# Patient Record
Sex: FEMALE | Race: WHITE | Employment: UNEMPLOYED | URBAN - METROPOLITAN AREA
[De-identification: names, ages, dates, MRNs, and addresses within clinical notes are randomized per-mention and may not be internally consistent; named-entity substitution may affect disease eponyms.]

---

## 2021-08-12 ENCOUNTER — OFFICE VISIT (OUTPATIENT)
Dept: ORTHOPEDIC SURGERY | Age: 36
End: 2021-08-12
Payer: MEDICAID

## 2021-08-12 VITALS
OXYGEN SATURATION: 99 % | WEIGHT: 122 LBS | HEIGHT: 62 IN | HEART RATE: 90 BPM | TEMPERATURE: 97.7 F | BODY MASS INDEX: 22.45 KG/M2

## 2021-08-12 DIAGNOSIS — M25.552 LEFT HIP PAIN: Primary | ICD-10-CM

## 2021-08-12 DIAGNOSIS — M76.32 IT BAND SYNDROME, LEFT: ICD-10-CM

## 2021-08-12 DIAGNOSIS — M70.62 TROCHANTERIC BURSITIS OF LEFT HIP: ICD-10-CM

## 2021-08-12 PROCEDURE — 20610 DRAIN/INJ JOINT/BURSA W/O US: CPT | Performed by: PHYSICIAN ASSISTANT

## 2021-08-12 PROCEDURE — 99203 OFFICE O/P NEW LOW 30 MIN: CPT | Performed by: PHYSICIAN ASSISTANT

## 2021-08-12 PROCEDURE — 73502 X-RAY EXAM HIP UNI 2-3 VIEWS: CPT | Performed by: PHYSICIAN ASSISTANT

## 2021-08-12 RX ORDER — TRIAMCINOLONE ACETONIDE 40 MG/ML
40 INJECTION, SUSPENSION INTRA-ARTICULAR; INTRAMUSCULAR ONCE
Status: COMPLETED | OUTPATIENT
Start: 2021-08-12 | End: 2021-08-12

## 2021-08-12 RX ADMIN — TRIAMCINOLONE ACETONIDE 40 MG: 40 INJECTION, SUSPENSION INTRA-ARTICULAR; INTRAMUSCULAR at 13:04
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
## 2021-08-19 ENCOUNTER — APPOINTMENT (OUTPATIENT)
Dept: PHYSICAL THERAPY | Age: 36
End: 2021-08-19
Attending: PHYSICIAN ASSISTANT

## 2021-08-24 ENCOUNTER — HOSPITAL ENCOUNTER (OUTPATIENT)
Dept: PHYSICAL THERAPY | Age: 36
Discharge: HOME | End: 2021-08-24
Attending: PHYSICIAN ASSISTANT
Payer: MEDICAID

## 2021-08-24 PROCEDURE — 97162 PT EVAL MOD COMPLEX 30 MIN: CPT

## 2021-08-24 NOTE — PROGRESS NOTES
PT DAILY TREATMENT NOTE  10-18    Patient Name: Claire Duarte  Date:2021  : 1985  [x]  Patient  Verified  Payor: [address] / Plan: [address] [facility] / Product Type: Managed Care Medicaid /    In time: 10:18  Out time:11:01  Total Treatment Time (min): 43  Visit #: 1 of 10    Treatment Area: Left hip pain [M25.552]    SUBJECTIVE  Pain Level (0-10 scale): 4  Any medication changes, allergies to medications, adverse drug reactions, diagnosis change, or new procedure performed?: [x] No    [] Yes (see summary sheet for update)  Subjective functional status/changes:   [] No changes reported  See POC    OBJECTIVE    20 min [x]Eval                  []Re-Eval     12 min Therapeutic Exercise:  [] See flow sheet : HEP instruction and demonstration   Rationale: increase ROM and increase strength to improve the patients ability to tolerate ADLs    8 min Self Care/Home Management: []  See flow sheet : pt education regarding anatomy and physiology of the LEs/pelvis and how it relates to the pt's condition. Rationale: increase ROM, increase strength and decrease pain/symptoms  to improve the patients ability to tolerate functional tasks. 3 min Manual Therapy: in right s/l: MET to correct sacral torsion. The manual therapy interventions were performed at a separate and distinct time from the therapeutic activities interventions. Rationale: decrease pain, increase ROM, increase tissue extensibility and increase postural awareness to improve ease of mobility. With   [x] TE   [] TA   [] neuro   [x] Other: Self Care/Home management Patient Education: [x] Review HEP    [] Progressed/Changed HEP based on:   [] positioning   [] body mechanics   [] transfers   [] heat/ice application    [] other:      Other Objective/Functional Measures: See evaluation. Pain Level (0-10 scale) post treatment: 5-6 weakness feeling    ASSESSMENT/Changes in Function: Pt given HEP handout to perform.  Pt understood exercises in HEP handout. Improvement in pelvic alignment noted post manual today but did report increased weakness at the end of the evaluation. Pt demonstrated decreased AROM, decreased strength, muscle tightness, tenderness to palpation, and impaired mobility. Pt would benefit from physical therapy to improve the above impairments to help the pt return to performing ADLs and functional ctivities. Patient will continue to benefit from skilled PT services to modify and progress therapeutic interventions, address functional mobility deficits, address ROM deficits, address strength deficits, analyze and address soft tissue restrictions, analyze and cue movement patterns, analyze and modify body mechanics/ergonomics, assess and modify postural abnormalities, address imbalance/dizziness and instruct in home and community integration to attain remaining goals. [x]  See Plan of Care  []  See progress note/recertification  []  See Discharge Summary         Progress towards goals / Updated goals:  Short Term Goals: To be accomplished in 2 treatments:  1. Pt will report compliance and independence to HEP to help the pt manage their pain and symptoms. Eval: established   Long Term Goals: To be accomplished in 10 treatments:  1. Pt will report 50% improvement in pain/symptoms to improve ability to perform ADLs. Eval: 0%  2. Pt will increase MMT left hip ABD to 4+/5, left hip ER to 4/5 to improve ability to tolerate prolonged ambulation. Eval: 4-/5 for both (pain with ER)  3. Pt will increase AROM left hip ER to 30 degs to improve ability to perform  tasks. Eval: 22 degs  4. Pt will report being able to sleep on her left side with minimal to no increased pain to improve sleep quality. Eval: reports having pain with sleeping on her left side.      PLAN  [x]  Upgrade activities as tolerated     [x]  Continue plan of care  [x]  Update interventions per flow sheet       []  Discharge due to:_  [] Other:_      Nadir De La Garza, PT 8/24/2021  11:08 AM    No future appointments.
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
## 2021-08-31 ENCOUNTER — TELEPHONE (OUTPATIENT)
Dept: ORTHOPEDIC SURGERY | Age: 36
End: 2021-08-31

## 2021-08-31 NOTE — TELEPHONE ENCOUNTER
Patient was last seen on 08/12 for left hip pain. She called today stating she is in a lot of pain while attending physical therapy. She is requesting a medication for this pain. Please advise.      Patient [phone]

## 2021-08-31 NOTE — TELEPHONE ENCOUNTER
Please let patient know that we recommend over-the-counter topical anti-inflammatory/analgesic medications and/or oral anti-inflammatory/analgesic medications OTC per 's recommended dosing.

## 2021-09-01 ENCOUNTER — HOSPITAL ENCOUNTER (OUTPATIENT)
Dept: PHYSICAL THERAPY | Age: 36
Discharge: HOME | End: 2021-09-01
Attending: PHYSICIAN ASSISTANT
Payer: MEDICAID

## 2021-09-01 PROCEDURE — 97112 NEUROMUSCULAR REEDUCATION: CPT

## 2021-09-01 PROCEDURE — 97140 MANUAL THERAPY 1/> REGIONS: CPT

## 2021-09-01 PROCEDURE — 97110 THERAPEUTIC EXERCISES: CPT

## 2021-09-01 NOTE — PROGRESS NOTES
PT DAILY TREATMENT NOTE     Patient Name: Louie Mónica  Date:2021  : 1985  [x]  Patient  Verified  Payor: [address] / Plan: [address] [facility] / Product Type: Managed Care Medicaid /    In time:940  Out time:1024  Total Treatment Time (min): 44  Visit #: 2 of 10      Treatment Area: Left hip pain [M25.552]    SUBJECTIVE  Pain Level (0-10 scale): 0  Any medication changes, allergies to medications, adverse drug reactions, diagnosis change, or new procedure performed?: [x] No    [] Yes (see summary sheet for update)  Subjective functional status/changes:   [] No changes reported  Patient reported improvement in left hip pain since eval    OBJECTIVE        24 min Therapeutic Exercise:  [x] See flow sheet :   Rationale: increase ROM and increase strength to improve the patients ability to perform ADLs      10 min Neuromuscular Re-education:  [x]  See flow sheet : core stability   Rationale: increase strength and improve coordination  to improve the patients ability to perform ADLs    10 min Manual Therapy:  Stork correction, sacral mobs, long axis distraction left LE   The manual therapy interventions were performed at a separate and distinct time from the therapeutic activities interventions. Rationale: decrease pain and increase ROM to increase B innominate mobility and decrease pain with functional movement. With   [] TE   [] TA   [] neuro   [] other: Patient Education: [x] Review HEP    [] Progressed/Changed HEP based on:   [] positioning   [] body mechanics   [] transfers   [] heat/ice application    [] other:      Other Objective/Functional Measures: initiated treatment per flow sheet and POC     Pain Level (0-10 scale) post treatment: 2    ASSESSMENT/Changes in Function: initiated treatment per POC and flow sheet.   Pelvic alignment corrected prior to therex and patient was able to tolerate with minimal increase in left hip \"soreness\" post.    Patient will continue to benefit from skilled PT services to modify and progress therapeutic interventions, address functional mobility deficits, address ROM deficits, address strength deficits, analyze and address soft tissue restrictions and analyze and cue movement patterns to attain remaining goals. [x]  See Plan of Care  []  See progress note/recertification  []  See Discharge Summary         Progress towards goals / Updated goals:  Short Term Goals: To be accomplished in 2 treatments:  1. Pt will report compliance and independence to [facility] to help the pt manage their pain and symptoms. Eval: established   Long Term Goals: To be accomplished in 10 treatments:  1. Pt will report 50% improvement in pain/symptoms to improve ability to perform ADLs. Eval: 0%  2. Pt will increase MMT left hip ABD to 4+/5, left hip ER to 4/5 to improve ability to tolerate prolonged ambulation. Eval: 4-/5 for both (pain with ER)  3. Pt will increase AROM left hip ER to 30 degs to improve ability to perform  tasks. Eval: 22 degs  4. Pt will report being able to sleep on her left side with minimal to no increased pain to improve sleep quality.    Eval: reports having pain with sleeping on her left side.        PLAN  []  Upgrade activities as tolerated     [x]  Continue plan of care  []  Update interventions per flow sheet       []  Discharge due to:_  []  Other:_      Serg Storey PTA 9/1/2021  10:05 AM    Future Appointments   Date Time Provider Ghassan Schmidt   9/3/2021 10:15 AM Devora Hamman, PTA [facility]   9/8/2021 10:15 AM Devora Hamman, PTA [facility]   9/10/2021 11:45 AM Bety Martinez PT [facility]   9/15/2021 11:00 AM Devora Hamman, PTA [facility]   9/17/2021 11:00 AM Bety Martinez PT [facility]   9/22/2021 11:00 AM Devora Hamman, PTA [facility]   9/24/2021 11:00 AM Bety Martinez PT [facility]   9/29/2021 11:00 AM Devora Hamman, PTA [facility]   10/1/2021 11:00 AM [facility] Cisneros [facility]

## 2021-09-03 ENCOUNTER — APPOINTMENT (OUTPATIENT)
Dept: PHYSICAL THERAPY | Age: 36
End: 2021-09-03
Attending: PHYSICIAN ASSISTANT
Payer: MEDICAID

## 2021-09-08 ENCOUNTER — HOSPITAL ENCOUNTER (OUTPATIENT)
Dept: PHYSICAL THERAPY | Age: 36
Discharge: HOME | End: 2021-09-08
Attending: PHYSICIAN ASSISTANT
Payer: MEDICAID

## 2021-09-08 PROCEDURE — 97014 ELECTRIC STIMULATION THERAPY: CPT

## 2021-09-08 PROCEDURE — 97110 THERAPEUTIC EXERCISES: CPT

## 2021-09-08 PROCEDURE — 97140 MANUAL THERAPY 1/> REGIONS: CPT

## 2021-09-08 NOTE — PROGRESS NOTES
PT DAILY TREATMENT NOTE     Patient Name: Beatriz Abreu  Date:2021  : 1985  [x]  Patient  Verified  Payor: [address] / Plan: [address] [facility] / Product Type: Managed Care Medicaid /    In time:330  Out time:410  Total Treatment Time (min): 40  Visit #: 3 of 10    Medicare/BCBS Only   Total Timed Codes (min):  40 1:1 Treatment Time:  30       Treatment Area: Left hip pain [M25.552]    SUBJECTIVE  Pain Level (0-10 scale): 5  Any medication changes, allergies to medications, adverse drug reactions, diagnosis change, or new procedure performed?: [x] No    [] Yes (see summary sheet for update)  Subjective functional status/changes:   [] No changes reported  Patient reported increased pain starting today in left hip    OBJECTIVE    Modality rationale: decrease pain and increase tissue extensibility to improve the patients ability to perform ADLs   Min Type Additional Details   10 [x] Estim:  [x]Unatt       []IFC  []Premod                        [x]Other: HiVolt [x]w/ice   []w/heat  Position:prone  Location: left piriformis    [] Estim: []Att    []TENS instruct  []NMES                    []Other:  []w/US   []w/ice   []w/heat  Position:  Location:    []  Traction: [] Cervical       []Lumbar                       [] Prone          []Supine                       []Intermittent   []Continuous Lbs:  [] before manual  [] after manual    []  Ultrasound: []Continuous   [] Pulsed                           []1MHz   []3MHz W/cm2:  Location:    []  Iontophoresis with dexamethasone         Location: [] Take home patch   [] In clinic    []  Ice     []  heat  []  Ice massage  []  Laser   []  Anodyne Position:  Location:    []  Laser with stim  []  Other:  Position:  Location:    []  Vasopneumatic Device    []  Right     []  Left  Pre-treatment girth:  Post-treatment girth:  Measured at (location):  Pressure:       [] lo [] med [] hi   Temperature: [] lo [] med [] hi   [] Skin assessment post-treatment:  []intact []redness- no adverse reaction    []redness  adverse reaction:         20 min Therapeutic Exercise:  [x] See flow sheet :   Rationale: increase ROM, increase strength and increase proprioception to improve the patients ability to perform ADLs        10 min Manual Therapy:  TPR left piriformis, sacral mobs, left LE long axis distraction and shot gun technique   The manual therapy interventions were performed at a separate and distinct time from the therapeutic activities interventions. Rationale: decrease pain, increase ROM and decrease trigger points to perform ADLs            With   [] TE   [] TA   [] neuro   [] other: Patient Education: [x] Review HEP    [] Progressed/Changed HEP based on:   [] positioning   [] body mechanics   [] transfers   [] heat/ice application    [] other:      Other Objective/Functional Measures:   Sacral torsion and left up slip - upslip corrected with long axis distraction but limited correction of sacral torsion     trial hivolt to treat mm spasms in left piriformis and improved alignment     Pain Level (0-10 scale) post treatment: 2    ASSESSMENT/Changes in Function: limited exercises and trial HiVolt to treat increased pain and mm spasm left piriformis. Improved pain reported post treatment. Patient will continue to benefit from skilled PT services to modify and progress therapeutic interventions, address functional mobility deficits, address ROM deficits, address strength deficits, analyze and address soft tissue restrictions and analyze and cue movement patterns to attain remaining goals. [x]  See Plan of Care  []  See progress note/recertification  []  See Discharge Summary         Progress towards goals / Updated goals:  Short Term Goals: To be accomplished in 2 treatments:  1. Pt will report compliance and independence to HEP to help the pt manage their pain and symptoms.             Eval: established   Long Term Goals: To be accomplished in 10 treatments:  1. Pt will report 50% improvement in pain/symptoms to improve ability to perform ADLs. Eval: 0%  2. Pt will increase MMT left hip ABD to 4+/5, left hip ER to 4/5 to improve ability to tolerate prolonged ambulation. Eval: 4-/5 for both (pain with ER)  3. Pt will increase AROM left hip ER to 30 degs to improve ability to perform  tasks. Eval: 22 degs  4. Pt will report being able to sleep on her left side with minimal to no increased pain to improve sleep quality.    Eval: reports having pain with sleeping on her left side.        PLAN  []  Upgrade activities as tolerated     [x]  Continue plan of care  []  Update interventions per flow sheet       []  Discharge due to:_  []  Other:_      Sterling Fierro PTA 9/8/2021  3:46 PM    Future Appointments   Date Time Provider Ghassan Schmidt   9/10/2021 11:45 AM Murlene Yousuf, PT [facility]   9/15/2021 11:00 AM Gisele [place], PTA [facility]   9/17/2021 11:00 AM Murlene Yousuf, PT [facility]   9/22/2021 11:00 AM Gisele [place], PTA [facility]   9/24/2021 11:00 AM Murlene Yousuf, PT [facility]   9/29/2021 11:00 AM Gisele [place], PTA [facility]   10/1/2021 11:00 AM Murlene Yousuf, PT [facility]   10/6/2021  9:30 AM Murlene Yousuf, PT [facility]

## 2021-09-10 ENCOUNTER — HOSPITAL ENCOUNTER (OUTPATIENT)
Dept: PHYSICAL THERAPY | Age: 36
Discharge: HOME | End: 2021-09-10
Attending: PHYSICIAN ASSISTANT
Payer: MEDICAID

## 2021-09-10 PROCEDURE — 97140 MANUAL THERAPY 1/> REGIONS: CPT

## 2021-09-10 PROCEDURE — 97032 APPL MODALITY 1+ESTIM EA 15: CPT

## 2021-09-10 PROCEDURE — 97110 THERAPEUTIC EXERCISES: CPT

## 2021-09-10 NOTE — PROGRESS NOTES
PT DAILY TREATMENT NOTE     Patient Name: Duanne Felty  Date:9/10/2021  : 1985  [x]  Patient  Verified  Payor: Harvinder Seo / Plan: [address] [place] [address] / Product Type: Managed Care Medicaid /    In time:1145  Out time:1230  Total Treatment Time (min): 45  Visit #: 4 of 10    Medicare/BCBS Only   Total Timed Codes (min):   1:1 Treatment Time:         Treatment Area: Left hip pain [M25.552]    SUBJECTIVE  Pain Level (0-10 scale): 4-5  Any medication changes, allergies to medications, adverse drug reactions, diagnosis change, or new procedure performed?: [x] No    [] Yes (see summary sheet for update)  Subjective functional status/changes:   [] No changes reported  Patient reported two days of pain relief after last visit but return of left sided back and buttock pain today    OBJECTIVE    Modality rationale: decrease pain and increase tissue extensibility to improve the patients ability to perform ADLs   Min Type Additional Details    [] Estim:  []Unatt       []IFC  []Premod                        []Other:  []w/ice   []w/heat  Position:  Location:   8 [x] Estim: [x]Att    []TENS instruct  []NMES                    [x]Other: combo [x]w/US   []w/ice   []w/heat  Position:prone  Location: left piriformis    []  Traction: [] Cervical       []Lumbar                       [] Prone          []Supine                       []Intermittent   []Continuous Lbs:  [] before manual  [] after manual    []  Ultrasound: []Continuous   [] Pulsed                           []1MHz   []3MHz W/cm2:  Location:    []  Iontophoresis with dexamethasone         Location: [] Take home patch   [] In clinic    []  Ice     []  heat  []  Ice massage  []  Laser   []  Anodyne Position:  Location:    []  Laser with stim  []  Other:  Position:  Location:    []  Vasopneumatic Device    []  Right     []  Left  Pre-treatment girth:  Post-treatment girth:  Measured at (location):  Pressure:       [] lo [] med [] hi   Temperature: [] lo [] med [] hi   [] Skin assessment post-treatment:  []intact []redness- no adverse reaction    []redness  adverse reaction:         27 min Therapeutic Exercise:  [x] See flow sheet :   Rationale: increase ROM and increase strength to improve the patients ability to perform ADLs      10 min Manual Therapy:  MET for anterior rotation right innominate, long axis distraction Left LE, sacral mobs for sacral torsion correction, TPR left lumbar paraspinals   The manual therapy interventions were performed at a separate and distinct time from the therapeutic activities interventions. Rationale: decrease pain, increase ROM, increase tissue extensibility and decrease trigger points to perform ADLs  Rationale: With   [] TE   [] TA   [] neuro   [] other: Patient Education: [x] Review HEP    [] Progressed/Changed HEP based on:   [] positioning   [] body mechanics   [] transfers   [] heat/ice application    [] other:      Other Objective/Functional Measures: sacral torsion, right anterior innominate and left upslip corrections utilized during treatment to correct alignment througout treatment     Frequent VCs and tactile cuings for proper TA set required with therex and unable to maintain in prone and substituted lumbar paraspinals to perform hip ext     Pain Level (0-10 scale) post treatment: 1    ASSESSMENT/Changes in Function: had to correct pelvic alignment 3 times today as patient struggled with maintaining core stability and substituted with lumbar paraspinals and hip flexors initially with limited ability to correct technique with cuing today. Patient will continue to benefit from skilled PT services to modify and progress therapeutic interventions, address functional mobility deficits, address ROM deficits, address strength deficits, analyze and address soft tissue restrictions and analyze and cue movement patterns to attain remaining goals.      [x]  See Plan of Care  []  See progress note/recertification  []  See Discharge Summary         Progress towards goals / Updated goals:  Short Term Goals: To be accomplished in 2 treatments:  1. Pt will report compliance and independence to HEP to help the pt manage their pain and symptoms.             Eval: established   Current: progressing, not independent maintaining TA set with HEP  Long Term Goals: To be accomplished in 10 treatments:  1. Pt will report 50% improvement in pain/symptoms to improve ability to perform ADLs. Eval: 0%  2. Pt will increase MMT left hip ABD to 4+/5, left hip ER to 4/5 to improve ability to tolerate prolonged ambulation. Eval: 4-/5 for both (pain with ER)  3. Pt will increase AROM left hip ER to 30 degs to improve ability to perform  tasks. Eval: 22 degs  4. Pt will report being able to sleep on her left side with minimal to no increased pain to improve sleep quality.    Eval: reports having pain with sleeping on her left side.        PLAN  []  Upgrade activities as tolerated     [x]  Continue plan of care  []  Update interventions per flow sheet       []  Discharge due to:_  []  Other:_      Chris Stinson PTA 9/10/2021  12:36 PM    Future Appointments   Date Time Provider Ghassan Schmidt   9/15/2021 11:00 AM Aracely Hudson PTA [facility]   9/17/2021 11:00 AM Fior Ku, PT [facility]   9/22/2021 11:00 AM Aracely Hudson PTA [facility]   9/24/2021 11:00 AM Fior Ku PT [facility]   9/29/2021 11:00 AM Aracely Hudson PTA [facility]   10/1/2021 11:00 AM Fior Ku PT [facility]   10/6/2021  9:30 AM Fior Ku, PT [facility]

## 2021-09-15 ENCOUNTER — HOSPITAL ENCOUNTER (OUTPATIENT)
Dept: PHYSICAL THERAPY | Age: 36
Discharge: HOME | End: 2021-09-15
Attending: PHYSICIAN ASSISTANT
Payer: MEDICAID

## 2021-09-15 PROCEDURE — 97140 MANUAL THERAPY 1/> REGIONS: CPT

## 2021-09-15 PROCEDURE — 97032 APPL MODALITY 1+ESTIM EA 15: CPT

## 2021-09-15 PROCEDURE — 97110 THERAPEUTIC EXERCISES: CPT

## 2021-09-15 NOTE — PROGRESS NOTES
PT DAILY TREATMENT NOTE     Patient Name: Davida Ortiz  Date:9/15/2021  : 1985  [x]  Patient  Verified  Payor: [address] / Plan: [address] [facility] / Product Type: Managed Care Medicaid /    In time:1100  Out time:1150  Total Treatment Time (min): 50  Visit #: 5 of 10    Medicare/BCBS Only   Total Timed Codes (min):   1:1 Treatment Time:         Treatment Area: Left hip pain [M25.552]    SUBJECTIVE  Pain Level (0-10 scale): 4  Any medication changes, allergies to medications, adverse drug reactions, diagnosis change, or new procedure performed?: [x] No    [] Yes (see summary sheet for update)  Subjective functional status/changes:   [] No changes reported  Patient reported some weakness and sharp pain periodically when she goes to stand up in left LE    OBJECTIVE    Modality rationale: decrease pain and increase tissue extensibility to improve the patients ability to perform ADLs   Min Type Additional Details    [] Estim:  []Unatt       []IFC  []Premod                        []Other:  []w/ice   []w/heat  Position:  Location:   10 (with set up) [x] Estim: [x]Att    []TENS instruct  []NMES                    []Other:  [x]w/US   []w/ice   []w/heat  Position:prone  Location:left glutes and lumbar paraspinals    []  Traction: [] Cervical       []Lumbar                       [] Prone          []Supine                       []Intermittent   []Continuous Lbs:  [] before manual  [] after manual    []  Ultrasound: []Continuous   [] Pulsed                           []1MHz   []3MHz W/cm2:  Location:    []  Iontophoresis with dexamethasone         Location: [] Take home patch   [] In clinic    []  Ice     []  heat  []  Ice massage  []  Laser   []  Anodyne Position:  Location:    []  Laser with stim  []  Other:  Position:  Location:    []  Vasopneumatic Device    []  Right     []  Left  Pre-treatment girth:  Post-treatment girth:  Measured at (location):  Pressure:       [] lo [] med [] hi Temperature: [] lo [] med [] hi   [] Skin assessment post-treatment:  []intact []redness- no adverse reaction    []redness  adverse reaction:         20 min Therapeutic Exercises:  [x]  See flow sheet :   Rationale: increase ROM and increase strength  to improve the patients ability to perform ADLs    20 min Manual Therapy:  T/s segmental mobs T7-8, sacral mobs and innominate mobs on left to correct posterior alignment, TPR left paraspinals and glutes   The manual therapy interventions were performed at a separate and distinct time from the therapeutic activities interventions. Rationale: decrease pain, increase ROM and increase tissue extensibility to perform ADLs              With   [] TE   [] TA   [] neuro   [] other: Patient Education: [x] Review HEP    [] Progressed/Changed HEP based on:   [] positioning   [] body mechanics   [] transfers   [] heat/ice application    [] other:      Other Objective/Functional Measures:   -posterior left innominate with sacral torsion and T/S rotation at T7-8 : corrected with MET and mobilization  -added high knees and toes touches, l/s stabs in prone, B HS stretching and dyn HS on left     Pain Level (0-10 scale) post treatment: 0    ASSESSMENT/Changes in Function: patient progressing well as able to fully correct pelvic alignment and maintain correction after performing therex today. Patient will continue to benefit from skilled PT services to modify and progress therapeutic interventions, address functional mobility deficits, address ROM deficits, address strength deficits and analyze and address soft tissue restrictions to attain remaining goals. [x]  See Plan of Care  []  See progress note/recertification  []  See Discharge Summary         Progress towards goals / Updated goals:  Short Term Goals: To be accomplished in 2 treatments:  1. Pt will report compliance and independence to HEP to help the pt manage their pain and symptoms.             Eval: established Current: progressing, not independent maintaining TA set with HEP  Long Term Goals: To be accomplished in 10 treatments:  1. Pt will report 50% improvement in pain/symptoms to improve ability to perform ADLs. Eval: 0%  Current: patient reported 0/10 pain posted pelvic alignment correction (9/15/21)  2. Pt will increase MMT left hip ABD to 4+/5, left hip ER to 4/5 to improve ability to tolerate prolonged ambulation. Eval: 4-/5 for both (pain with ER)  3. Pt will increase AROM left hip ER to 30 degs to improve ability to perform  tasks. Eval: 22 degs  4. Pt will report being able to sleep on her left side with minimal to no increased pain to improve sleep quality.    Eval: reports having pain with sleeping on her left side.        PLAN  []  Upgrade activities as tolerated     [x]  Continue plan of care  []  Update interventions per flow sheet       []  Discharge due to:_  []  Other:_      Peace Zhao PTA 9/15/2021  12:01 PM    Future Appointments   Date Time Provider Ghassan Schmidt   9/17/2021 11:00 AM Polo Del Castillo PT [facility]   9/22/2021 11:00 AM Carleen Quinteros PTA [facility]   9/24/2021 11:00 AM Polo Del Castillo PT [facility]   9/29/2021 11:00 AM Carleen Quinteros PTA [facility]   10/1/2021 11:00 AM Polo Del Castillo PT [facility]   10/6/2021  9:30 AM Polo Del Castillo PT [facility]

## 2021-09-17 ENCOUNTER — HOSPITAL ENCOUNTER (OUTPATIENT)
Dept: PHYSICAL THERAPY | Age: 36
Discharge: HOME | End: 2021-09-17
Attending: PHYSICIAN ASSISTANT
Payer: MEDICAID

## 2021-09-17 PROCEDURE — 97112 NEUROMUSCULAR REEDUCATION: CPT

## 2021-09-17 PROCEDURE — 97110 THERAPEUTIC EXERCISES: CPT

## 2021-09-17 PROCEDURE — 97035 APP MDLTY 1+ULTRASOUND EA 15: CPT

## 2021-09-17 NOTE — PROGRESS NOTES
PT DAILY TREATMENT NOTE     Patient Name: Dre Tubbs  Date:2021  : 1985  [x]  Patient  Verified  Payor: [address] [place] [address] Ave / Plan: [address] [facility] / Product Type: Managed Care Medicaid /    In time:1111  Out time:1148  Total Treatment Time (min): 37  Visit #: 6 of 10    Medicare/BCBS Only   Total Timed Codes (min):  37 1:1 Treatment Time:  37       Treatment Area: Left hip pain [M25.552]    SUBJECTIVE  Pain Level (0-10 scale): 2  Any medication changes, allergies to medications, adverse drug reactions, diagnosis change, or new procedure performed?: [x] No    [] Yes (see summary sheet for update)  Subjective functional status/changes:   [] No changes reported  Pain stayed away until this morning.     OBJECTIVE    Modality rationale: decrease inflammation, decrease pain and increase tissue extensibility to improve the patients ability to tolerate increased activity   Min Type Additional Details    [] Estim:  []Unatt       []IFC  []Premod                        []Other:  []w/ice   []w/heat  Position:  Location:    [] Estim: []Att    []TENS instruct  []NMES                    []Other:  []w/US   []w/ice   []w/heat  Position:  Location:    []  Traction: [] Cervical       []Lumbar                       [] Prone          []Supine                       []Intermittent   []Continuous Lbs:  [] before manual  [] after manual   8 [x]  Ultrasound: [x]Continuous   [] Pulsed                           [x]1MHz   []3MHz W/cm2: 1.2  Location:Left Superior glute/glute medius    []  Iontophoresis with dexamethasone         Location: [] Take home patch   [] In clinic    []  Ice     []  heat  []  Ice massage  []  Laser   []  Anodyne Position:  Location:    []  Laser with stim  []  Other:  Position:  Location:    []  Vasopneumatic Device    []  Right     []  Left  Pre-treatment girth:  Post-treatment girth:  Measured at (location):  Pressure:       [] lo [] med [] hi   Temperature: [] lo [] med [] hi   [] Skin assessment post-treatment:  []intact []redness- no adverse reaction    []redness  adverse reaction:     12 min Therapeutic Exercise:  [x] See flow sheet :   Rationale: increase ROM, increase strength and improve coordination to improve the patients ability to tolerate increased activity    9 min Neuromuscular Re-education:  []  See flow sheet :   Rationale: increase strength and improve coordination  to improve the patients ability to improve core stability    8 min Manual Therapy:  (B) Innominate p/a mobs, (B) LE LAD, (B) L/S and T/S Rot mobs, STM/DTM left Superior glute/Glute Medius     The manual therapy interventions were performed at a separate and distinct time from the therapeutic activities interventions. Rationale: decrease pain, increase ROM, increase tissue extensibility and decrease trigger points to improve daily activity          With   [x] TE   [] TA   [x] neuro   [] other: Patient Education: [x] Review HEP    [] Progressed/Changed HEP based on:   [] positioning   [] body mechanics   [] transfers   [] heat/ice application    [] other:      Other Objective/Functional Measures:   - Improved pelvic alignment and mobility today  - Post Rot left innominate  - Elev right sacral base     Pain Level (0-10 scale) post treatment: 1    ASSESSMENT/Changes in Function:   - Good response to treatment with improved pelvic alignment and mobility upon arrival today  - Good response to MT and use of US with decreased muscle tightness and decreased pain after treatment    Patient will continue to benefit from skilled PT services to modify and progress therapeutic interventions, address functional mobility deficits, address ROM deficits, address strength deficits, analyze and address soft tissue restrictions and analyze and cue movement patterns to attain remaining goals.      []  See Plan of Care  []  See progress note/recertification  []  See Discharge Summary         Progress towards goals / Updated goals:  Short Term Goals: To be accomplished in 2 treatments:  1. Pt will report compliance and independence to HEP to help the pt manage their pain and symptoms.             Eval: established   Current: progressing, not independent maintaining TA set with HEP  Long Term Goals: To be accomplished in 10 treatments:  1. Pt will report 50% improvement in pain/symptoms to improve ability to perform ADLs. Eval: 0%  Current: patient reported 0/10 pain posted pelvic alignment correction (9/15/21)  2. Pt will increase MMT left hip ABD to 4+/5, left hip ER to 4/5 to improve ability to tolerate prolonged ambulation. Eval: 4-/5 for both (pain with ER)  3. Pt will increase AROM left hip ER to 30 degs to improve ability to perform  tasks. Eval: 22 degs  4. Pt will report being able to sleep on her left side with minimal to no increased pain to improve sleep quality. Eval: reports having pain with sleeping on her left side.   Current Status:  Pt reports improved tolerance with less discomfort 9/17/21     PLAN  [x]  Upgrade activities as tolerated     [x]  Continue plan of care  []  Update interventions per flow sheet       []  Discharge due to:_  []  Other:_      Donavon Correa, PT 9/17/2021  11:13 AM    Future Appointments   Date Time Provider Ghassan Schmidt   9/22/2021 11:00 AM Shasta Sor [facility]   9/24/2021 11:00 AM Jonathan Bahena PT [facility]   9/29/2021 11:00 AM Shasta Sor [facility]   10/1/2021 11:00 AM Jonathan Bahena PT [facility]   10/6/2021  9:30 AM Jonathan Bahena PT [facility]

## 2021-09-22 ENCOUNTER — HOSPITAL ENCOUNTER (OUTPATIENT)
Dept: PHYSICAL THERAPY | Age: 36
Discharge: HOME | End: 2021-09-22
Attending: PHYSICIAN ASSISTANT
Payer: MEDICAID

## 2021-09-22 PROCEDURE — 97112 NEUROMUSCULAR REEDUCATION: CPT

## 2021-09-22 PROCEDURE — 97110 THERAPEUTIC EXERCISES: CPT

## 2021-09-22 PROCEDURE — 97530 THERAPEUTIC ACTIVITIES: CPT

## 2021-09-22 NOTE — PROGRESS NOTES
PT DAILY TREATMENT NOTE     Patient Name: Ariel Rhoades  Date:2021  : 1985  [x]  Patient  Verified  Payor: [address] / Plan: [facility] / Product Type: Managed Care Medicaid /    In time:11:00  Out time:11:46  Total Treatment Time (min): 46  Visit #: 7 of 10    Treatment Area: Left hip pain [M25.552]    SUBJECTIVE  Pain Level (0-10 scale): 0  Any medication changes, allergies to medications, adverse drug reactions, diagnosis change, or new procedure performed?: [x] No    [] Yes (see summary sheet for update)  Subjective functional status/changes:   [] No changes reported  Pt states she has been feeling better after PT appts and the pain doesn't return as quickly    OBJECTIVE    22 min Therapeutic Exercise:  [x] See flow sheet :   Rationale: increase ROM and increase strength to improve the patients ability to perform ADLs    8 min Therapeutic Activities:  [x] See flow sheet :   Rationale: increase ROM and increase strength to improve the patients ability to perform daily tasks      8 min Neuromuscular Re-education:  [x]  See flow sheet :   Rationale: increase strength, improve coordination and increase proprioception  to improve the patients ability to perform functional tasks    8 min Manual Therapy:  MET to correct right AI followed by shotgun technique, left leg pull to correct left upslip, left PA SIJ mobs, DTM/TPR to left paraspinals, piri, and upper glutes, left hip flexor release   The manual therapy interventions were performed at a separate and distinct time from the therapeutic activities interventions.   Rationale: decrease pain, increase ROM and increase tissue extensibility to improve functional mobility            With   [] TE   [] TA   [] neuro   [] other: Patient Education: [x] Review HEP    [] Progressed/Changed HEP based on:   [] positioning   [] body mechanics   [] transfers   [] heat/ice application    [] other:      Other Objective/Functional Measures:      Pain Level (0-10 scale) post treatment: 1-2    ASSESSMENT/Changes in Function: Pt reports cont'd frequent left leg \"giving out\", huseyin with stairs. Reports decr'd frequency and intensity of pain since [facility]. TTP @ right hip flexors today. cont'd ms tension @ left piri and L/s paraspinals. Held US today as pt states little to no relief recently and would like to focus on strengthening. Patient will continue to benefit from skilled PT services to modify and progress therapeutic interventions, address functional mobility deficits, address ROM deficits, address strength deficits, analyze and address soft tissue restrictions, analyze and cue movement patterns, analyze and modify body mechanics/ergonomics and assess and modify postural abnormalities to attain remaining goals. []  See Plan of Care  []  See progress note/recertification  []  See Discharge Summary         Progress towards goals / Updated goals:  Short Term Goals: To be accomplished in 2 treatments:  1. Pt will report compliance and independence to HEP to help the pt manage their pain and symptoms.             Eval: established   Current: progressing, not independent maintaining TA set with HEP  Long Term Goals: To be accomplished in 10 treatments:  1. Pt will report 50% improvement in pain/symptoms to improve ability to perform ADLs. Eval: 0%  Current: patient reported 0/10 pain posted pelvic alignment correction (9/15/21)  2. Pt will increase MMT left hip ABD to 4+/5, left hip ER to 4/5 to improve ability to tolerate prolonged ambulation. Eval: 4-/5 for both (pain with ER)  3. Pt will increase AROM left hip ER to 30 degs to improve ability to perform  tasks. Eval: 22 degs  4. Pt will report being able to sleep on her left side with minimal to no increased pain to improve sleep quality. Eval: reports having pain with sleeping on her left side.   Current Status:  Pt reports improved tolerance with less discomfort 9/17/21     PLAN  []  Upgrade activities as tolerated     [x]  Continue plan of care  []  Update interventions per flow sheet       []  Discharge due to:_  []  Other:_      Amber Pinzon PTA 9/22/2021  10:41 AM    Future Appointments   Date Time Provider Ghassan Schmidt   9/22/2021 11:00 AM Ann Elizabeth [facility]   9/24/2021 11:00 AM Heladio Kenyon, PT [facility]   9/29/2021 11:00 AM Marta Elizabeth [facility]   10/1/2021 11:00 AM Heladio Kenyon, PT [facility]   10/6/2021  9:30 AM Heladio Kenyon, PT [facility]

## 2021-09-24 ENCOUNTER — HOSPITAL ENCOUNTER (OUTPATIENT)
Dept: PHYSICAL THERAPY | Age: 36
Discharge: HOME | End: 2021-09-24
Attending: PHYSICIAN ASSISTANT
Payer: MEDICAID

## 2021-09-24 PROCEDURE — 97110 THERAPEUTIC EXERCISES: CPT

## 2021-09-24 PROCEDURE — 97116 GAIT TRAINING THERAPY: CPT

## 2021-09-24 PROCEDURE — 97112 NEUROMUSCULAR REEDUCATION: CPT

## 2021-09-24 NOTE — PROGRESS NOTES
PT DAILY TREATMENT NOTE     Patient Name: Christa Telles  Date:2021  : 1985  [x]  Patient  Verified  [address] / Plan: [address] [facility] / Product Type: Managed Care Medicaid /    In time:1100  Out time: 1145  Total Treatment Time (min): 45  Visit #: 8 of 10    Medicare/BCBS Only   Total Timed Codes (min):  45 1:1 Treatment Time:  45       Treatment Area: Left hip pain [M25.552]    SUBJECTIVE  Pain Level (0-10 scale): 0  Any medication changes, allergies to medications, adverse drug reactions, diagnosis change, or new procedure performed?: [x] No    [] Yes (see summary sheet for update)  Subjective functional status/changes:   [] No changes reported  Not having as many problems. Just feel the left knee when stepping with the left keg. No pain this week. OBJECTIVE      25 min Therapeutic Exercise:  [x] See flow sheet :   Rationale: increase ROM, increase strength and improve coordination to improve the patients ability to tolerate increased activity    10 min Neuromuscular Re-education:  []  See flow sheet :   Rationale: increase strength and improve coordination  to improve the patients ability to improve function    10 min Gait Trainin' VCs and TCs for arm swing/weight shift/trunk rot   Rationale:           With   [x] TE   [] TA   [x] neuro   [] other: Patient Education: [x] Review HEP    [] Progressed/Changed HEP based on:   [] positioning   [] body mechanics   [] transfers   [] heat/ice application    [] other:      Other Objective/Functional Measures:   - Difficulty with performing mini squats  - Gait training with guided arm swing and sequencing  - MMT left Hip ABD 5- ER 4-  - Dec pelvic obliquity  - decreased weight left shift with ambulation     Pain Level (0-10 scale) post treatment: 0    ASSESSMENT/Changes in Function:   - Verbal and tactile cues for mini squats  - Improved gait pattern with improved pelvic sway, trunk rotation and arm swing    Patient will continue to benefit from skilled PT services to modify and progress therapeutic interventions, address functional mobility deficits, address ROM deficits, address strength deficits, analyze and address soft tissue restrictions and analyze and cue movement patterns to attain remaining goals. []  See Plan of Care  []  See progress note/recertification  []  See Discharge Summary         Progress towards goals / Updated goals:  Short Term Goals: To be accomplished in 2 treatments:  1. Pt will report compliance and independence to HEP to help the pt manage their pain and symptoms.             Eval: established   Current Status: progressing, not independent maintaining TA set with HEP  Long Term Goals: To be accomplished in 10 treatments:  1. Pt will report 50% improvement in pain/symptoms to improve ability to perform ADLs. Eval: 0%  Current Status: Patient reports 40-50% improvement since start of treatment 9/24/21  2. Pt will increase MMT left hip ABD to 4+/5, left hip ER to 4/5 to improve ability to tolerate prolonged ambulation. Eval: 4-/5 for both (pain with ER)  Current Status: Progressing with MMT Left Hip ABD 5- ER 4-  3. Pt will increase AROM left hip ER to 30 degs to improve ability to perform  tasks. Eval: 22 degs  4. Pt will report being able to sleep on her left side with minimal to no increased pain to improve sleep quality. Eval: reports having pain with sleeping on her left side.   Current Status:  Pt reports improved tolerance with less discomfort 9/17/21        PLAN  [x]  Upgrade activities as tolerated     [x]  Continue plan of care  []  Update interventions per flow sheet       []  Discharge due to:_  []  Other:_      Consuelo Lr, PT 9/24/2021  11:26 AM    Future Appointments   Date Time Provider Ghassan Schmidt   9/29/2021 11:00 AM Rosy Jain   10/1/2021 11:00 AM Otilio Yang, PT [place] [facility]   10/6/2021  9:30 AM Otilio Yang, PT [facility]

## 2021-09-29 ENCOUNTER — HOSPITAL ENCOUNTER (OUTPATIENT)
Dept: PHYSICAL THERAPY | Age: 36
Discharge: HOME | End: 2021-09-29
Attending: PHYSICIAN ASSISTANT
Payer: MEDICAID

## 2021-09-29 PROCEDURE — 97530 THERAPEUTIC ACTIVITIES: CPT

## 2021-09-29 PROCEDURE — 97110 THERAPEUTIC EXERCISES: CPT

## 2021-09-29 PROCEDURE — 97112 NEUROMUSCULAR REEDUCATION: CPT

## 2021-09-29 NOTE — PROGRESS NOTES
PT DAILY TREATMENT NOTE     Patient Name: Danielle Wheeler  Date:2021  : 1985  [x]  Patient  Verified  Payor: Indira Novak / Plan: [facility] / Product Type: Managed Care Medicaid /    In time:10:56  Out time:11:43  Total Treatment Time (min): 46  Visit #: 9 of 10    Treatment Area: Left hip pain [M25.552]    SUBJECTIVE  Pain Level (0-10 scale): 2  Any medication changes, allergies to medications, adverse drug reactions, diagnosis change, or new procedure performed?: [x] No    [] Yes (see summary sheet for update)  Subjective functional status/changes:   [] No changes reported  Pt reports feeling pretty good today    OBJECTIVE    23 min Therapeutic Exercise:  [x] See flow sheet :   Rationale: increase ROM and increase strength to improve the patients ability to perform ADLs    8 min Therapeutic Activity:  -  See flow sheet :   Rationale: increase strength, improve coordination, improve balance and increase proprioception  to improve the patients ability to increase ease with daily tasks     10 min Neuromuscular Re-education:  [x]  See flow sheet :   Rationale: increase strength, improve coordination and increase proprioception  to improve the patients ability to perform functional tsaks    5 min Manual Therapy:  MET to correct right AI followed by shot gun   The manual therapy interventions were performed at a separate and distinct time from the therapeutic activities interventions.   Rationale: decrease pain, increase ROM and increase tissue extensibility to improve functional mobility             With   [] TE   [] TA   [] neuro   [] other: Patient Education: [x] Review HEP    [] Progressed/Changed HEP based on:   [] positioning   [] body mechanics   [] transfers   [] heat/ice application    [] other:      Other Objective/Functional Measures:      Pain Level (0-10 scale) post treatment: 1    ASSESSMENT/Changes in Function: Challenged with prone ext d/t limited available ROM and min c/o soreness. Challenged with SL stability, observed hip drop with right SLS. Demo's glute fatigue with clams. No pain following treatment, just some ms soreness. Patient will continue to benefit from skilled PT services to modify and progress therapeutic interventions, address functional mobility deficits, address ROM deficits, address strength deficits, analyze and address soft tissue restrictions, analyze and cue movement patterns, analyze and modify body mechanics/ergonomics and assess and modify postural abnormalities to attain remaining goals. []  See Plan of Care  []  See progress note/recertification  []  See Discharge Summary         Progress towards goals / Updated goals:  Short Term Goals: To be accomplished in 2 treatments:  1. Pt will report compliance and independence to HEP to help the pt manage their pain and symptoms.             Eval: established   Current Status: progressing, not independent maintaining TA set with HEP  Long Term Goals: To be accomplished in 10 treatments:  1. Pt will report 50% improvement in pain/symptoms to improve ability to perform ADLs. Eval: 0%  Current Status: Patient reports 40-50% improvement since start of treatment 9/24/21  2. Pt will increase MMT left hip ABD to 4+/5, left hip ER to 4/5 to improve ability to tolerate prolonged ambulation. Eval: 4-/5 for both (pain with ER)  Current Status: Progressing with MMT Left Hip ABD 5- ER 4-  3. Pt will increase AROM left hip ER to 30 degs to improve ability to perform  tasks. Eval: 22 degs  4. Pt will report being able to sleep on her left side with minimal to no increased pain to improve sleep quality. Eval: reports having pain with sleeping on her left side.   Current Status:  Met, able to sleep on left side 9/29/2021    PLAN  []  Upgrade activities as tolerated     [x]  Continue plan of care  []  Update interventions per flow sheet       []  Discharge due to:_  []  Other:_      Latasha Galvan PTA 9/29/2021  10:45 AM    Future Appointments   Date Time Provider Ghassan Schmidt   9/29/2021 11:00 AM Petrona Silver [facility]   10/1/2021 11:00 AM Maria Victoria Bundy [facility]   10/6/2021  9:30 AM Karen Boykin PT [facility]

## 2021-10-01 ENCOUNTER — APPOINTMENT (OUTPATIENT)
Dept: PHYSICAL THERAPY | Age: 36
End: 2021-10-01
Attending: PHYSICIAN ASSISTANT
Payer: MEDICAID

## 2021-10-06 ENCOUNTER — APPOINTMENT (OUTPATIENT)
Dept: PHYSICAL THERAPY | Age: 36
End: 2021-10-06
Attending: PHYSICIAN ASSISTANT
Payer: MEDICAID

## 2021-10-08 ENCOUNTER — HOSPITAL ENCOUNTER (OUTPATIENT)
Dept: PHYSICAL THERAPY | Age: 36
Discharge: HOME | End: 2021-10-08
Attending: PHYSICIAN ASSISTANT
Payer: MEDICAID

## 2021-10-08 PROCEDURE — 97110 THERAPEUTIC EXERCISES: CPT

## 2021-10-08 PROCEDURE — 97530 THERAPEUTIC ACTIVITIES: CPT

## 2021-10-08 PROCEDURE — 97112 NEUROMUSCULAR REEDUCATION: CPT
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
## 2021-10-08 NOTE — PROGRESS NOTES
PT DAILY TREATMENT NOTE     Patient Name: Jeovany Pena  Date:10/8/2021  : 1985  [x]  Patient  Verified  Payor: Felicitas Young / Plan: [address] [place] [address] / Product Type: Managed Care Medicaid /    In time:1:15  Out time:1:50  Total Treatment Time (min): 35  Visit #: 10 of 10    Treatment Area: Left hip pain [M25.552]    SUBJECTIVE  Pain Level (0-10 scale): 0  Any medication changes, allergies to medications, adverse drug reactions, diagnosis change, or new procedure performed?: [x] No    [] Yes (see summary sheet for update)  Subjective functional status/changes:   [] No changes reported  Pt reports walking around [place] scream and only have about a 1/10 pain    OBJECTIVE    10 min Therapeutic Exercise:  [x] See flow sheet :   Rationale: increase ROM and increase strength to improve the patients ability to perform ADLs    10 min Therapeutic Activity:  [x]  See flow sheet :   Rationale: increase strength and improve coordination  to improve the patients ability to increase ease with daily tasks     10 min Neuromuscular Re-education:  [x]  See flow sheet :   Rationale: increase strength, improve coordination and increase proprioception  to improve the patients ability to perform functional tasks    5 min Manual Therapy:  MET to correct right AI followed by shotgun   The manual therapy interventions were performed at a separate and distinct time from the therapeutic activities interventions.   Rationale: decrease pain, increase ROM and increase tissue extensibility to improve functional mobility             With   [] TE   [] TA   [] neuro   [] other: Patient Education: [x] Review HEP    [] Progressed/Changed HEP based on:   [] positioning   [] body mechanics   [] transfers   [] heat/ice application    [] other:      Other Objective/Functional Measures:   AROM Left hip ER 33*  MMT left hip abd 4+ ER 4+   FOTO 72    Pain Level (0-10 scale) post treatment: 0    ASSESSMENT/Changes in Function: Patient has made great progress in Physical Therapy towards improved sleep tolerance, activity tolerance, improved strength and ROM. Patient is now able to walk prolonged distances with only minimal discomfort. Patient states she has not felt this good in years. Patient is compliant and Independent with HEP and prepared for D/C from PT at this time. Patient will continue to benefit from skilled PT services to modify and progress therapeutic interventions, address functional mobility deficits, address ROM deficits, address strength deficits, analyze and address soft tissue restrictions, analyze and cue movement patterns, analyze and modify body mechanics/ergonomics and assess and modify postural abnormalities to attain remaining goals. []  See Plan of Care  []  See progress note/recertification  []  See Discharge Summary         Progress towards goals / Updated goals:  Short Term Goals: To be accomplished in 2 treatments:  1. Pt will report compliance and independence to HEP to help the pt manage their pain and symptoms.             Eval: established   Current Status: Met, pt reports compliant 10/8/2021  Long Term Goals: To be accomplished in 10 treatments:  1. Pt will report 50% improvement in pain/symptoms to improve ability to perform ADLs. Eval: 0%  Current Status: 75% improvement since [facility] 10/8/2021  2. Pt will increase MMT left hip ABD to 4+/5, left hip ER to 4/5 to improve ability to tolerate prolonged ambulation. Eval: 4-/5 for both (pain with ER)  Current Status: Progressing with MMT Left Hip ABD 5- ER 4- 10/8/2021  3. Pt will increase AROM left hip ER to 30 degs to improve ability to perform  tasks. Eval: 22 degs  Current:Met, left hip ER 33* 10/8/2021  4. Pt will report being able to sleep on her left side with minimal to no increased pain to improve sleep quality. Eval: reports having pain with sleeping on her left side.   Current Status:  Met, able to sleep on left side 9/29/2021    PLAN  []  Upgrade activities as tolerated     []  Continue plan of care  []  Update interventions per flow sheet       [x]  Discharge due to:_ goals met  []  Other:_      Tray Figueroa PTA 10/8/2021  1:16 PM    Future Appointments   Date Time Provider Ghassan Schmidt   10/13/2021  9:30 [address] [facility]

## 2021-10-08 NOTE — PROGRESS NOTES
Physical Therapy Discharge Instructions      [facility] at [address] [facility]  Phone: [phone]      Fax:  [phone]    Patient: Radha De Guzman  : 1985      Continue Home Exercise Program 1-2 times per day for 4 weeks, then decrease to 4-5 times per week      Continue with    [x] Ice  as needed     [x] Heat           Follow up with MD:     [] Upon completion of therapy     [x] As needed      Recommendations:     [x]   Return to activity with home program    []   Return to activity with the following modifications:       []Post Rehab Program    []Join Independent aquatic program     []Return to/join local gym        Additional Comments:  Thank you for working with us!!!!          Gracie Figueroa, CLAUDE 10/8/2021 1:45 PM

## 2021-10-13 ENCOUNTER — APPOINTMENT (OUTPATIENT)
Dept: PHYSICAL THERAPY | Age: 36
End: 2021-10-13
Attending: PHYSICIAN ASSISTANT
Payer: MEDICAID